# Patient Record
Sex: FEMALE | Race: WHITE | URBAN - NONMETROPOLITAN AREA
[De-identification: names, ages, dates, MRNs, and addresses within clinical notes are randomized per-mention and may not be internally consistent; named-entity substitution may affect disease eponyms.]

---

## 2019-10-10 PROBLEM — E78.5 HYPERLIPIDEMIA: Status: ACTIVE | Noted: 2019-10-10

## 2019-10-10 PROBLEM — I10 ESSENTIAL HYPERTENSION: Status: ACTIVE | Noted: 2019-10-10

## 2019-10-11 ENCOUNTER — OFFICE VISIT (OUTPATIENT)
Dept: CARDIOLOGY CLINIC | Age: 69
End: 2019-10-11
Payer: COMMERCIAL

## 2019-10-11 VITALS
DIASTOLIC BLOOD PRESSURE: 80 MMHG | WEIGHT: 134 LBS | BODY MASS INDEX: 25.3 KG/M2 | HEART RATE: 57 BPM | HEIGHT: 61 IN | SYSTOLIC BLOOD PRESSURE: 166 MMHG

## 2019-10-11 DIAGNOSIS — E78.5 HYPERLIPIDEMIA, UNSPECIFIED HYPERLIPIDEMIA TYPE: ICD-10-CM

## 2019-10-11 DIAGNOSIS — I10 ESSENTIAL HYPERTENSION: ICD-10-CM

## 2019-10-11 PROCEDURE — G8419 CALC BMI OUT NRM PARAM NOF/U: HCPCS | Performed by: INTERNAL MEDICINE

## 2019-10-11 PROCEDURE — G8484 FLU IMMUNIZE NO ADMIN: HCPCS | Performed by: INTERNAL MEDICINE

## 2019-10-11 PROCEDURE — 1090F PRES/ABSN URINE INCON ASSESS: CPT | Performed by: INTERNAL MEDICINE

## 2019-10-11 PROCEDURE — 99204 OFFICE O/P NEW MOD 45 MIN: CPT | Performed by: INTERNAL MEDICINE

## 2019-10-11 PROCEDURE — G8427 DOCREV CUR MEDS BY ELIG CLIN: HCPCS | Performed by: INTERNAL MEDICINE

## 2019-10-11 PROCEDURE — 93000 ELECTROCARDIOGRAM COMPLETE: CPT | Performed by: INTERNAL MEDICINE

## 2019-10-11 RX ORDER — LISINOPRIL 20 MG/1
20 TABLET ORAL DAILY
Qty: 30 TABLET | Refills: 5 | Status: SHIPPED | OUTPATIENT
Start: 2019-10-11 | End: 2019-10-18 | Stop reason: DRUGHIGH

## 2019-10-11 RX ORDER — SIMVASTATIN 20 MG
20 TABLET ORAL NIGHTLY
COMMUNITY
End: 2020-06-02 | Stop reason: SDUPTHER

## 2019-10-11 RX ORDER — METOPROLOL SUCCINATE 50 MG/1
50 TABLET, EXTENDED RELEASE ORAL DAILY
COMMUNITY
End: 2020-06-02 | Stop reason: SDUPTHER

## 2019-10-11 RX ORDER — CLONIDINE HYDROCHLORIDE 0.1 MG/1
0.1 TABLET ORAL 2 TIMES DAILY
COMMUNITY
End: 2020-06-02 | Stop reason: SDUPTHER

## 2019-10-18 ENCOUNTER — OFFICE VISIT (OUTPATIENT)
Dept: CARDIOLOGY CLINIC | Age: 69
End: 2019-10-18
Payer: COMMERCIAL

## 2019-10-18 VITALS
HEIGHT: 61 IN | SYSTOLIC BLOOD PRESSURE: 168 MMHG | DIASTOLIC BLOOD PRESSURE: 90 MMHG | WEIGHT: 134 LBS | BODY MASS INDEX: 25.3 KG/M2

## 2019-10-18 DIAGNOSIS — I10 HYPERTENSION, UNCONTROLLED: Primary | ICD-10-CM

## 2019-10-18 DIAGNOSIS — E78.5 HYPERLIPIDEMIA, UNSPECIFIED HYPERLIPIDEMIA TYPE: ICD-10-CM

## 2019-10-18 PROCEDURE — G8484 FLU IMMUNIZE NO ADMIN: HCPCS | Performed by: INTERNAL MEDICINE

## 2019-10-18 PROCEDURE — 99214 OFFICE O/P EST MOD 30 MIN: CPT | Performed by: INTERNAL MEDICINE

## 2019-10-18 PROCEDURE — 1123F ACP DISCUSS/DSCN MKR DOCD: CPT | Performed by: INTERNAL MEDICINE

## 2019-10-18 PROCEDURE — G8400 PT W/DXA NO RESULTS DOC: HCPCS | Performed by: INTERNAL MEDICINE

## 2019-10-18 PROCEDURE — 4004F PT TOBACCO SCREEN RCVD TLK: CPT | Performed by: INTERNAL MEDICINE

## 2019-10-18 PROCEDURE — G8417 CALC BMI ABV UP PARAM F/U: HCPCS | Performed by: INTERNAL MEDICINE

## 2019-10-18 PROCEDURE — 1090F PRES/ABSN URINE INCON ASSESS: CPT | Performed by: INTERNAL MEDICINE

## 2019-10-18 PROCEDURE — 4040F PNEUMOC VAC/ADMIN/RCVD: CPT | Performed by: INTERNAL MEDICINE

## 2019-10-18 PROCEDURE — 3017F COLORECTAL CA SCREEN DOC REV: CPT | Performed by: INTERNAL MEDICINE

## 2019-10-18 PROCEDURE — G8427 DOCREV CUR MEDS BY ELIG CLIN: HCPCS | Performed by: INTERNAL MEDICINE

## 2019-10-18 RX ORDER — LISINOPRIL 40 MG/1
40 TABLET ORAL DAILY
Qty: 90 TABLET | Refills: 3 | Status: SHIPPED | OUTPATIENT
Start: 2019-10-18 | End: 2020-04-07 | Stop reason: SDUPTHER

## 2019-10-24 ENCOUNTER — TELEPHONE (OUTPATIENT)
Dept: CARDIOLOGY CLINIC | Age: 69
End: 2019-10-24

## 2019-10-28 ENCOUNTER — TELEPHONE (OUTPATIENT)
Dept: CARDIOLOGY CLINIC | Age: 69
End: 2019-10-28

## 2019-11-01 RX ORDER — AMLODIPINE BESYLATE 10 MG/1
10 TABLET ORAL DAILY
Qty: 30 TABLET | Refills: 5 | Status: SHIPPED | OUTPATIENT
Start: 2019-11-01 | End: 2020-04-07 | Stop reason: SDUPTHER

## 2020-01-20 ENCOUNTER — TELEPHONE (OUTPATIENT)
Dept: CARDIOLOGY CLINIC | Age: 70
End: 2020-01-20

## 2020-01-20 NOTE — TELEPHONE ENCOUNTER
Please fax the EKG tracing from 10/11/19 to [facility].  Fax [phone]

## 2020-04-07 RX ORDER — AMLODIPINE BESYLATE 10 MG/1
10 TABLET ORAL DAILY
Qty: 30 TABLET | Refills: 5 | Status: SHIPPED | OUTPATIENT
Start: 2020-04-07 | End: 2020-06-02 | Stop reason: SDUPTHER

## 2020-04-07 RX ORDER — LISINOPRIL 40 MG/1
40 TABLET ORAL DAILY
Qty: 90 TABLET | Refills: 3 | Status: SHIPPED | OUTPATIENT
Start: 2020-04-07 | End: 2020-06-02 | Stop reason: SDUPTHER

## 2020-04-07 NOTE — TELEPHONE ENCOUNTER
Medication Refill    Medication needing refilled: amLODIPine (NORVASC) 10 MG tablet, lisinopril (PRINIVIL;ZESTRIL) 40 MG tablet    Doseage of the medication:    How are you taking this medication (QD, BID, TID, QID, PRN):    30 or 90 day supply called in: 80    Which Pharmacy are we sending the medication to?: [id number], [address] [facility]

## 2020-06-01 NOTE — PROGRESS NOTES
excursion and expansion without use of accessory muscles  Resp Auscultation: Normal breath sounds without dullness  Cardiovascular: The apical impulses not displaced  Heart tones are crisp and normal  Cervical veins are not engorged  Normal S1S2, No S3, No Murmur  Peripheral pulses are symmetrical and full  Extremities: There is no clubbing, cyanosis of the extremities. No edema  Femoral Arteries: 2+ and equal  Pedal Pulses: 2+ and equal   Abdomen:  No masses or tenderness  Liver/Spleen: No Abnormalities Noted  Neurological/Psychiatric:  Alert and oriented in all spheres  No abnormalities of mood, affect, memory    All testing and labs listed below were personally reviewed. Assessment:   htn  /64 (Site: Left Upper Arm, Position: Sitting, Cuff Size: Medium Adult)   Pulse 60   Resp 20   Ht 5' 1\" (1.549 m)   Wt 142 lb (64.4 kg)   SpO2 98%   BMI 26.83 kg/m²    10/18/2019 renal  Duplex--nl  Stable on norvasc catapres lisinopril and toprol    Hyperlipidemia  On zocor 20 mg daily  Followed by pcp    Edema  Bilateral  Exacerbated by norvasc  Recommend elevation of legs, support hose    Plan:  Follow up in one year with Dr Kristi Worthington  Call for any changes    Thank you for allowing me to participate in the care of this individual.    Scribe Attestation:  Heath Kaufman am scribing for and in the presence of Bri Lee MD.   Rashel Han 06/02/20 3:30 PM   Provider Norma Hernandez is working as a scribe for and in the presence of me (Bri Lee MD). Working as a scribe, Salvador Monsalve may have prepopulated components of this note with my historical  intellectual property under my direct supervision. Any additions to this intellectual property were performed in my presence and at my direction. Furthermore, the content and accuracy of this note have been reviewed by me Bri Lee MD). Juliet Scott M.D., [facility].

## 2020-06-02 ENCOUNTER — OFFICE VISIT (OUTPATIENT)
Dept: CARDIOLOGY CLINIC | Age: 70
End: 2020-06-02
Payer: COMMERCIAL

## 2020-06-02 VITALS
HEIGHT: 61 IN | OXYGEN SATURATION: 98 % | BODY MASS INDEX: 26.81 KG/M2 | SYSTOLIC BLOOD PRESSURE: 118 MMHG | HEART RATE: 60 BPM | DIASTOLIC BLOOD PRESSURE: 64 MMHG | WEIGHT: 142 LBS | RESPIRATION RATE: 20 BRPM

## 2020-06-02 PROCEDURE — 99213 OFFICE O/P EST LOW 20 MIN: CPT | Performed by: INTERNAL MEDICINE

## 2020-06-02 PROCEDURE — G8400 PT W/DXA NO RESULTS DOC: HCPCS | Performed by: INTERNAL MEDICINE

## 2020-06-02 PROCEDURE — G8417 CALC BMI ABV UP PARAM F/U: HCPCS | Performed by: INTERNAL MEDICINE

## 2020-06-02 PROCEDURE — 1123F ACP DISCUSS/DSCN MKR DOCD: CPT | Performed by: INTERNAL MEDICINE

## 2020-06-02 PROCEDURE — 1090F PRES/ABSN URINE INCON ASSESS: CPT | Performed by: INTERNAL MEDICINE

## 2020-06-02 PROCEDURE — 4004F PT TOBACCO SCREEN RCVD TLK: CPT | Performed by: INTERNAL MEDICINE

## 2020-06-02 PROCEDURE — G8427 DOCREV CUR MEDS BY ELIG CLIN: HCPCS | Performed by: INTERNAL MEDICINE

## 2020-06-02 PROCEDURE — 4040F PNEUMOC VAC/ADMIN/RCVD: CPT | Performed by: INTERNAL MEDICINE

## 2020-06-02 PROCEDURE — 3017F COLORECTAL CA SCREEN DOC REV: CPT | Performed by: INTERNAL MEDICINE

## 2020-06-02 RX ORDER — AMLODIPINE BESYLATE 10 MG/1
10 TABLET ORAL DAILY
Qty: 30 TABLET | Refills: 5 | Status: SHIPPED | OUTPATIENT
Start: 2020-06-02

## 2020-06-02 RX ORDER — LISINOPRIL 40 MG/1
40 TABLET ORAL DAILY
Qty: 90 TABLET | Refills: 3 | Status: SHIPPED | OUTPATIENT
Start: 2020-06-02

## 2020-06-02 RX ORDER — METOPROLOL SUCCINATE 50 MG/1
50 TABLET, EXTENDED RELEASE ORAL DAILY
Qty: 90 TABLET | Refills: 3 | Status: SHIPPED | OUTPATIENT
Start: 2020-06-02

## 2020-06-02 RX ORDER — CLONIDINE HYDROCHLORIDE 0.1 MG/1
0.1 TABLET ORAL 2 TIMES DAILY
Qty: 180 TABLET | Refills: 3 | Status: SHIPPED | OUTPATIENT
Start: 2020-06-02 | End: 2021-06-29 | Stop reason: SDUPTHER

## 2020-06-02 RX ORDER — SIMVASTATIN 20 MG
20 TABLET ORAL NIGHTLY
Qty: 90 TABLET | Refills: 3 | Status: SHIPPED | OUTPATIENT
Start: 2020-06-02

## 2020-06-15 ENCOUNTER — TELEPHONE (OUTPATIENT)
Dept: CARDIOLOGY CLINIC | Age: 70
End: 2020-06-15

## 2020-06-15 NOTE — TELEPHONE ENCOUNTER
Pt Stated that she is having Palpitations 1-2 times a week. Pt stated that the chest pains comes and goes. Pt stated that she is also having burning and tingles in the left arm \"like her left arm is going to sleep\" Pt stated that this has been going on for about 1-2 weeks. She also stated that she is having burning in between her shoulder blades. Pt stated that all the symptoms have started to get really bad today. I spoke with Dejuan Carbajal RN she advise that the pt go to the ER. Pt verbalized understanding.

## 2020-07-24 ENCOUNTER — TELEPHONE (OUTPATIENT)
Dept: CARDIOLOGY CLINIC | Age: 70
End: 2020-07-24

## 2020-12-01 NOTE — PROGRESS NOTES
[address]   Cardiac Followup    Referring Provider:  Laly Watkins DO     Chief Complaint   Patient presents with    6 Month Follow-Up     Pt states she has been having increased heartburn.  Hyperlipidemia    Hypertension        History of Present Illness:  Mrs Kim Escobedo is seen as a follow up for management of htn,hchol and diabetes. She has a history of tobacco abuse    Today she complains of random episodes of fullness in her throat, associated with heartburn,not exacerbated by food or exercise. She has reflux and acid in her throat. She has not taken any antacids to see if this helps. She started riding her stationary bike, because she has gained 10 pounds. She has unchanged exertional sob alleviated with rest. She has no palpitations dizziness or syncope. Patient is compliant  with medication and is tolerating them well without side effects      Past Medical History:   has a past medical history of Arthritis, Hyperlipidemia, Hypertension, and Type II or unspecified type diabetes mellitus without mention of complication, not stated as uncontrolled. Surgical History:   has a past surgical history that includes Hysterectomy and Tonsillectomy. Social History:   reports that she has been smoking cigarettes. She has a 10.00 pack-year smoking history. She has never used smokeless tobacco. She reports that she does not drink alcohol. Family History:  family history includes Cancer in her mother; Diabetes in her brother and sister; Heart Disease in her brother and sister; High Blood Pressure in her brother and sister; Osteoporosis in her paternal aunt and paternal uncle; Stroke in her father.      Home Medications:  Current Outpatient Medications   Medication Sig Dispense Refill    amLODIPine (NORVASC) 10 MG tablet Take 1 tablet by mouth daily 30 tablet 5    lisinopril (PRINIVIL;ZESTRIL) 40 MG tablet Take 1 tablet by mouth daily 90 tablet 3    simvastatin (ZOCOR) 20 MG tablet Take 1 tablet by mouth nightly 90 tablet 3    cloNIDine (CATAPRES) 0.1 MG tablet Take 1 tablet by mouth 2 times daily 180 tablet 3    metoprolol succinate (TOPROL XL) 50 MG extended release tablet Take 1 tablet by mouth daily 90 tablet 3    metformin (GLUCOPHAGE-XR) 500 MG XR tablet Take 1 tablet by mouth every evening. 90 tablet 1    aspirin 81 MG tablet Take 81 mg by mouth daily.  meloxicam (MOBIC) 7.5 MG tablet Take 7.5 mg by mouth daily       cyclobenzaprine (FLEXERIL) 10 MG tablet Take 10 mg by mouth daily. No current facility-administered medications for this visit. Allergies:  Patient has no known allergies. Review of Systems:   · Constitutional: there has been no unanticipated weight loss. There's been no change in energy level, sleep pattern, or activity level. · Eyes: No visual changes or diplopia. No scleral icterus. · ENT: No Headaches, hearing loss or vertigo. No mouth sores or sore throat. · Cardiovascular: Reviewed in HPI  · Respiratory: No cough or wheezing, no sputum production. No hematemesis. · Gastrointestinal: No abdominal pain, appetite loss, blood in stools. No change in bowel or bladder habits. · Genitourinary: No dysuria, trouble voiding, or hematuria. · Musculoskeletal:  No gait disturbance, weakness or joint complaints. · Integumentary: No rash or pruritis. · Neurological: No headache, diplopia, change in muscle strength, numbness or tingling. No change in gait, balance, coordination, mood, affect, memory, mentation, behavior. · Psychiatric: No anxiety, no depression. · Endocrine: No malaise, fatigue or temperature intolerance. No excessive thirst, fluid intake, or urination. No tremor. · Hematologic/Lymphatic: No abnormal bruising or bleeding, blood clots or swollen lymph nodes. · Allergic/Immunologic: No nasal congestion or hives.     Physical Examination:    Vitals:    12/04/20 1441   BP: 116/76   Pulse: 68        Constitutional and General Appearance:   . NAD   SKIN:  .     Warm and dry  EYES:    .     EOMI  Neck:   . Normal carotid contour  Respiratory:  Normal excursion and expansion without use of accessory muscles  Resp Auscultation: Normal breath sounds without dullness  Cardiovascular: The apical impulses not displaced  Heart tones are crisp and normal  Cervical veins are not engorged  Normal S1S2, No S3, No Murmur  Peripheral pulses are symmetrical and full  Extremities: There is no clubbing, cyanosis of the extremities. No edema  Femoral Arteries: 2+ and equal  Pedal Pulses: 2+ and equal   Abdomen:  No masses or tenderness  Liver/Spleen: No Abnormalities Noted  Neurological/Psychiatric:  Alert and oriented in all spheres  No abnormalities of mood, affect, memory    All testing and labs listed below were personally reviewed. Assessment:   Throat/chest fulleness  ekg--unremarkable  Stress echo  May use tums or gaviscon, if stress is unremarkable, recommend she follow up with pcp/GI    htn  /76 (Site: Left Upper Arm, Position: Sitting, Cuff Size: Medium Adult)   Pulse 68   Ht 5' 1\" (1.549 m)   Wt 153 lb (69.4 kg)   BMI 28.91 kg/m²    10/18/2019 renal  Duplex--nl  Stable on norvasc catapres lisinopril and toprol    Hyperlipidemia  4/9/19  Tc 151  Tri 61 hdl 82 ldl 57 ast 20 alt 12  On zocor 20 mg daily  Followed by pcp    Edema  Bilateral  Exacerbated by norvasc  Recommend elevation of legs, support hose    Plan:  Stress echo--if unremarkable will refer to Dr Андрей Hernández  Follow up in 6 months with Dr Neda Lyons  Call for any changes    Thank you for allowing me to participate in the care of this individual.    Scribe Attestation:  Jhon PEDROZA, am scribing for and in the presence of Lizzie Upton MD.   Raymond Larson 12/04/20 3:17 PM   Provider Alessandra Sabillon is working as a scribe for and in the presence of me Lizzie Upton MD).   Working as a scribe, Jhon Naidu may have prepopulated components of this note with my historical  intellectual property under my direct supervision. Any additions to this intellectual property were performed in my presence and at my direction. Furthermore, the content and accuracy of this note have been reviewed by me Maksim Mandujano MD). Bladimir Juarez M.D., [facility].

## 2020-12-04 ENCOUNTER — OFFICE VISIT (OUTPATIENT)
Dept: CARDIOLOGY CLINIC | Age: 70
End: 2020-12-04
Payer: COMMERCIAL

## 2020-12-04 VITALS
WEIGHT: 153 LBS | BODY MASS INDEX: 28.89 KG/M2 | SYSTOLIC BLOOD PRESSURE: 116 MMHG | HEIGHT: 61 IN | HEART RATE: 68 BPM | DIASTOLIC BLOOD PRESSURE: 76 MMHG

## 2020-12-04 PROBLEM — R60.9 EDEMA: Status: ACTIVE | Noted: 2020-12-04

## 2020-12-04 PROCEDURE — G8427 DOCREV CUR MEDS BY ELIG CLIN: HCPCS | Performed by: INTERNAL MEDICINE

## 2020-12-04 PROCEDURE — 4004F PT TOBACCO SCREEN RCVD TLK: CPT | Performed by: INTERNAL MEDICINE

## 2020-12-04 PROCEDURE — G8400 PT W/DXA NO RESULTS DOC: HCPCS | Performed by: INTERNAL MEDICINE

## 2020-12-04 PROCEDURE — 1123F ACP DISCUSS/DSCN MKR DOCD: CPT | Performed by: INTERNAL MEDICINE

## 2020-12-04 PROCEDURE — 99213 OFFICE O/P EST LOW 20 MIN: CPT | Performed by: INTERNAL MEDICINE

## 2020-12-04 PROCEDURE — 93000 ELECTROCARDIOGRAM COMPLETE: CPT | Performed by: INTERNAL MEDICINE

## 2020-12-04 PROCEDURE — 3017F COLORECTAL CA SCREEN DOC REV: CPT | Performed by: INTERNAL MEDICINE

## 2020-12-04 PROCEDURE — G8417 CALC BMI ABV UP PARAM F/U: HCPCS | Performed by: INTERNAL MEDICINE

## 2020-12-04 PROCEDURE — G8484 FLU IMMUNIZE NO ADMIN: HCPCS | Performed by: INTERNAL MEDICINE

## 2020-12-04 PROCEDURE — 4040F PNEUMOC VAC/ADMIN/RCVD: CPT | Performed by: INTERNAL MEDICINE

## 2020-12-04 PROCEDURE — 1090F PRES/ABSN URINE INCON ASSESS: CPT | Performed by: INTERNAL MEDICINE

## 2020-12-09 ENCOUNTER — PROCEDURE VISIT (OUTPATIENT)
Dept: CARDIOLOGY CLINIC | Age: 70
End: 2020-12-09
Payer: COMMERCIAL

## 2020-12-09 LAB
LV EF: 50 %
LVEF MODALITY: NORMAL

## 2020-12-09 PROCEDURE — 93325 DOPPLER ECHO COLOR FLOW MAPG: CPT | Performed by: INTERNAL MEDICINE

## 2020-12-09 PROCEDURE — 93351 STRESS TTE COMPLETE: CPT | Performed by: INTERNAL MEDICINE

## 2020-12-09 PROCEDURE — 93320 DOPPLER ECHO COMPLETE: CPT | Performed by: INTERNAL MEDICINE

## 2021-06-16 NOTE — PROGRESS NOTES
[address]  21  Referring: Dr. Shellie Pérez CONSULT/CHIEF COMPLAINT/HPI     Reason for visit/ Chief complaint  Follow up   Risk factor managment   HPI Emmanuel Hardy is a 65 y.o. seen as a follow up for management of hypertension,hyperlipidemia and diabetes. She has a history of tobacco abuse. Her sister had several heart attacks. .She is unsure of her parents medical history. Father  age 68,mother  age 45 ( had kidney problems)  Ms Efren Acevedo has been [facility] since she was in her 29's    In the interval since her last visit she had a stress echo to evaluate \"indigestion. \" She did not achieve THR,which she attributes to leg weakness/fatigue    She has intermittent uncomfortable sensation in her back ( left and right), that has not changed or increased in severity. No associated dizziness, diaphoresis, chest or arm pain. Lasts about 15 minutes This is exacerbated by exertion, digging, lifting heavy objects, lasting 15 minutes, alleviated with rest.  She treats her back pain with ibuprofen, had an injection last week. Patient is compliant with medications and is tolerating them well without side effects     HISTORY/ALLERGIES/ROS     MedHx:  has a past medical history of Arthritis, Hyperlipidemia, Hypertension, and Type II or unspecified type diabetes mellitus without mention of complication, not stated as uncontrolled. SurgHx:  has a past surgical history that includes Hysterectomy and Tonsillectomy. SocHx:  reports that she has been smoking cigarettes. She has a 10.00 pack-year smoking history. She has never used smokeless tobacco. She reports that she does not drink alcohol. FamHx: Mom with cabg  Allergies: Patient has no known allergies. ROS:   Review of Systems   Constitutional: Positive for fatigue. Negative for activity change, diaphoresis and fever. HENT: Negative for congestion and ear discharge. Eyes: Negative for photophobia and visual disturbance.    Respiratory: Positive for shortness of breath. Negative for cough and chest tightness. Cardiovascular: Positive for palpitations. Negative for chest pain. Gastrointestinal: Positive for abdominal distention and nausea. Negative for abdominal pain and blood in stool. Endocrine: Negative for cold intolerance and polydipsia. Genitourinary: Negative for difficulty urinating and flank pain. Musculoskeletal: Positive for arthralgias, back pain, myalgias and neck pain. Skin: Negative for rash and wound. Allergic/Immunologic: Negative for environmental allergies and immunocompromised state. Neurological: Positive for dizziness, weakness and headaches. Hematological: Negative for adenopathy. Does not bruise/bleed easily. Psychiatric/Behavioral: Negative for confusion. The patient is not hyperactive. MEDICATIONS      Prior to Admission medications    Medication Sig Start Date End Date Taking? Authorizing Provider   furosemide (LASIX) 20 MG tablet TAKE 1 TABLET BY MOUTH ONCE DAILY 5/5/21  Yes Historical Provider, MD   ibuprofen (ADVIL;MOTRIN) 800 MG tablet TAKE 1 TABLET BY MOUTH THREE TIMES DAILY 5/19/21  Yes Historical Provider, MD   amLODIPine (NORVASC) 10 MG tablet Take 1 tablet by mouth daily 6/2/20  Yes Sabine Bond MD   lisinopril (PRINIVIL;ZESTRIL) 40 MG tablet Take 1 tablet by mouth daily 6/2/20  Yes Sabine Bond MD   simvastatin (ZOCOR) 20 MG tablet Take 1 tablet by mouth nightly 6/2/20  Yes Sabine Bond MD   cloNIDine (CATAPRES) 0.1 MG tablet Take 1 tablet by mouth 2 times daily 6/2/20  Yes Sabine Bond MD   metoprolol succinate (TOPROL XL) 50 MG extended release tablet Take 1 tablet by mouth daily 6/2/20  Yes Sabine Bond MD   metformin (GLUCOPHAGE-XR) 500 MG XR tablet Take 1 tablet by mouth every evening. 6/30/11  Yes Ashish Ashton MD   aspirin 81 MG tablet Take 81 mg by mouth daily.    Yes Historical Provider, MD   meloxicam (MOBIC) 7.5 MG tablet Take 7.5 mg by mouth daily Yes Historical Provider, MD   cyclobenzaprine (FLEXERIL) 10 MG tablet Take 10 mg by mouth daily. 9/16/10  Yes Historical Provider, MD       PHYSICAL EXAM        Vitals:    21 1130   BP: 116/60   Pulse: 62   Resp: 18   SpO2: 98%    Weight: 132 lb (59.9 kg)     Gen Alert, cooperative, no distress Heart  Regular rate and rhythm, no murmur   Head Normocephalic, atraumatic, no abnormalities Abd  Soft, NT, +BS, no mass, no OM   Eyes PERRLA, conj/corn clear Ext  Ext nl, AT, no C/C, no edema   Nose Nares normal, no drain age, Non-tender Pulse 2+ and symmetric   Throat Lips, mucosa, tongue normal Skin Color/text/turg nl, no rash/lesions   Neck S/S, TM, NT, no bruit Psych Nl mood and affect   Lung CTA-B, unlabored, no DTP Shoulders Tender to palpation, tense bilaterally   Ch wall NT, no deform       LABS and Imaging     Relevant and available CV data reviewed  Echo/MRI: 20 Normal left ventricle size, wall thickness and systolic function with an estimated ejection fraction of 55%. No regional wall motion abnormalities are seen. E/e\"= 9. Diastolic filling parameters suggests grade I diastolic dysfunctionTrivial mitral regurgitation. Mild tricuspid regurgitation. PASP 21mmHg. Trivial pulmonic regurgitation. IVC size is normal (<2.1cm) and collapses > 50% with respiration consistent  with normal RA pressure (3mmHg). Incidental finding, gallstones  Cath: none  Holter: none  EK20  NSR. Personally interpreted  Stress:20 Normal stress echocardiogram study. Decreased sensitivity, failure to reach target heart rate. 79% of predicted   HR. There was stress induced shortness of breath 6/10. Peak pulse ox 99%. Leg   fatigue during the exam, unable to walk very far for the stress  moderate Risk  moderate Complexity/Medical Decision Making  Outside records Reviewed  Labs Reviewed  Prior Imaging, ekg, =, echo reviewed when available  Medications reviewed  Old Notes reviewed  ASSESSMENT AND PLAN   1.  Risk factors for CAD  - new problem  - did not achieve THR during stress echo  - significant family history,plus risk factors ( tobacco abuse, hypertension,hyperlipidemia)  Plan  - calcium score to guide ascvd managemnet    2. Nicotine dependence  - smokes 10 cigarettes a day  - unsuccessful with nicotine gum  - interested in quitting with use of nicotine patch  Plan  - nicotine patch/gum  - quit date August 1    3. Essential Hypertension  - 116/60  - on norvasc 10 mg daily lisinopril 40 mg frazier catapres0.1 mg bid  -stable at goal  Plan  - continue above    4. Mixed Hyperlipidemia  - on zocor 20 mg  Plan  - continue zocor    5. Fibromyalgia and chronic pain    Patient counseled on lifestyle modification, diet, and exercise. Follow Up: 6months    Dr. Kevin Olson:  I, Megan Berry, am scribing for and in the presence of Bernie Lundberg MD.   Lester Vargas 06/18/21 12:12 PM   Physician Attestation  The scribe for and in the presence of marcella Osman DO). The scribe George Ward may have prepopulated components of this note with my historical  intellectual property under my direct supervision. Any additions to this intellectual property were performed in my presence and at my direction.   Furthermore, the content and accuracy of this note have been reviewed by me Janene Osman DO).  6/20/2021 10:12 PM

## 2021-06-18 ENCOUNTER — OFFICE VISIT (OUTPATIENT)
Dept: CARDIOLOGY CLINIC | Age: 71
End: 2021-06-18
Payer: COMMERCIAL

## 2021-06-18 VITALS
DIASTOLIC BLOOD PRESSURE: 60 MMHG | HEIGHT: 61 IN | SYSTOLIC BLOOD PRESSURE: 116 MMHG | WEIGHT: 132 LBS | RESPIRATION RATE: 18 BRPM | HEART RATE: 62 BPM | OXYGEN SATURATION: 98 % | BODY MASS INDEX: 24.92 KG/M2

## 2021-06-18 DIAGNOSIS — Z72.0 TOBACCO ABUSE: ICD-10-CM

## 2021-06-18 DIAGNOSIS — Z91.89 HIGH RISK OF CARDIAC EVENT: Primary | ICD-10-CM

## 2021-06-18 DIAGNOSIS — I10 HYPERTENSION, UNCONTROLLED: ICD-10-CM

## 2021-06-18 DIAGNOSIS — E78.5 HYPERLIPIDEMIA, UNSPECIFIED HYPERLIPIDEMIA TYPE: ICD-10-CM

## 2021-06-18 DIAGNOSIS — Z82.49 FAMILY HISTORY OF EARLY CAD: ICD-10-CM

## 2021-06-18 PROCEDURE — G8427 DOCREV CUR MEDS BY ELIG CLIN: HCPCS | Performed by: INTERNAL MEDICINE

## 2021-06-18 PROCEDURE — 3017F COLORECTAL CA SCREEN DOC REV: CPT | Performed by: INTERNAL MEDICINE

## 2021-06-18 PROCEDURE — 99204 OFFICE O/P NEW MOD 45 MIN: CPT | Performed by: INTERNAL MEDICINE

## 2021-06-18 PROCEDURE — 4004F PT TOBACCO SCREEN RCVD TLK: CPT | Performed by: INTERNAL MEDICINE

## 2021-06-18 PROCEDURE — 4040F PNEUMOC VAC/ADMIN/RCVD: CPT | Performed by: INTERNAL MEDICINE

## 2021-06-18 PROCEDURE — G8420 CALC BMI NORM PARAMETERS: HCPCS | Performed by: INTERNAL MEDICINE

## 2021-06-18 PROCEDURE — G8400 PT W/DXA NO RESULTS DOC: HCPCS | Performed by: INTERNAL MEDICINE

## 2021-06-18 PROCEDURE — 1090F PRES/ABSN URINE INCON ASSESS: CPT | Performed by: INTERNAL MEDICINE

## 2021-06-18 PROCEDURE — 1123F ACP DISCUSS/DSCN MKR DOCD: CPT | Performed by: INTERNAL MEDICINE

## 2021-06-18 RX ORDER — FUROSEMIDE 20 MG/1
TABLET ORAL
COMMUNITY
Start: 2021-05-05

## 2021-06-18 RX ORDER — IBUPROFEN 800 MG/1
TABLET ORAL
COMMUNITY
Start: 2021-05-19

## 2021-06-20 ASSESSMENT — ENCOUNTER SYMPTOMS
NAUSEA: 1
PHOTOPHOBIA: 0
ABDOMINAL DISTENTION: 1
SHORTNESS OF BREATH: 1
BLOOD IN STOOL: 0
COUGH: 0
ABDOMINAL PAIN: 0
CHEST TIGHTNESS: 0
BACK PAIN: 1

## 2021-06-28 ENCOUNTER — TELEPHONE (OUTPATIENT)
Dept: CARDIOLOGY CLINIC | Age: 71
End: 2021-06-28

## 2021-06-28 DIAGNOSIS — I25.83 CORONARY ARTERY DISEASE DUE TO LIPID RICH PLAQUE: Primary | ICD-10-CM

## 2021-06-28 DIAGNOSIS — I25.10 CORONARY ARTERY DISEASE DUE TO LIPID RICH PLAQUE: Primary | ICD-10-CM

## 2021-06-28 DIAGNOSIS — Z82.49 FAMILY HISTORY OF CORONARY ARTERY DISEASE: ICD-10-CM

## 2021-06-28 DIAGNOSIS — E78.2 MIXED HYPERLIPIDEMIA: ICD-10-CM

## 2021-06-28 NOTE — TELEPHONE ENCOUNTER
DKW wants pt to have a cardiac calcium score. Pt would like to have this done at [facility] since that is the closest for her. Please fax orders so she can call and schedule test.  Pls call to advise. Thank you.       Medication Refill    Medication needing refilled: clonidine    Dosage of the medication: 0.1 MG    How are you taking this medication (QD, BID, TID, QID, PRN): 1 tablet 2x daily    30 or 90 day supply called in: 180 tablet w/refills    When will you run out of your medication: Tuesday    Which Pharmacy are we sending the medication to?: [facility] [phone]

## 2021-06-29 RX ORDER — CLONIDINE HYDROCHLORIDE 0.1 MG/1
0.1 TABLET ORAL 2 TIMES DAILY
Qty: 180 TABLET | Refills: 3 | Status: SHIPPED | OUTPATIENT
Start: 2021-06-29

## 2021-09-23 ENCOUNTER — TELEPHONE (OUTPATIENT)
Dept: CARDIOLOGY CLINIC | Age: 71
End: 2021-09-23

## 2021-09-23 NOTE — TELEPHONE ENCOUNTER
Patient returning call to make her 6 month f/u appt with ARTURO in the [facility] office . There are no open appts in December in the [place] office . Should she be worked in with [employer] or should she be seen in January?

## 2021-09-27 ENCOUNTER — TELEPHONE (OUTPATIENT)
Dept: CARDIOLOGY CLINIC | Age: 71
End: 2021-09-27

## 2022-05-03 ENCOUNTER — TELEPHONE (OUTPATIENT)
Dept: CARDIOLOGY CLINIC | Age: 72
End: 2022-05-03

## 2022-05-03 NOTE — TELEPHONE ENCOUNTER
Please call and get patient an appt with dereck, first available, she did not follow up with her vicki appt

## 2023-07-11 ENCOUNTER — OFFICE VISIT (OUTPATIENT)
Dept: CARDIOLOGY CLINIC | Age: 73
End: 2023-07-11
Payer: COMMERCIAL

## 2023-07-11 VITALS
DIASTOLIC BLOOD PRESSURE: 58 MMHG | OXYGEN SATURATION: 98 % | HEIGHT: 61 IN | SYSTOLIC BLOOD PRESSURE: 104 MMHG | HEART RATE: 90 BPM | BODY MASS INDEX: 26.45 KG/M2 | WEIGHT: 140.1 LBS

## 2023-07-11 DIAGNOSIS — R06.02 SHORTNESS OF BREATH: ICD-10-CM

## 2023-07-11 DIAGNOSIS — Z72.0 TOBACCO ABUSE: ICD-10-CM

## 2023-07-11 DIAGNOSIS — R53.83 OTHER FATIGUE: ICD-10-CM

## 2023-07-11 DIAGNOSIS — I20.8 ANGINAL EQUIVALENT (HCC): ICD-10-CM

## 2023-07-11 DIAGNOSIS — E78.2 MIXED HYPERLIPIDEMIA: ICD-10-CM

## 2023-07-11 DIAGNOSIS — I10 ESSENTIAL HYPERTENSION: Primary | ICD-10-CM

## 2023-07-11 PROCEDURE — G8427 DOCREV CUR MEDS BY ELIG CLIN: HCPCS | Performed by: INTERNAL MEDICINE

## 2023-07-11 PROCEDURE — 93000 ELECTROCARDIOGRAM COMPLETE: CPT | Performed by: INTERNAL MEDICINE

## 2023-07-11 PROCEDURE — G8400 PT W/DXA NO RESULTS DOC: HCPCS | Performed by: INTERNAL MEDICINE

## 2023-07-11 PROCEDURE — 1090F PRES/ABSN URINE INCON ASSESS: CPT | Performed by: INTERNAL MEDICINE

## 2023-07-11 PROCEDURE — 3074F SYST BP LT 130 MM HG: CPT | Performed by: INTERNAL MEDICINE

## 2023-07-11 PROCEDURE — 3078F DIAST BP <80 MM HG: CPT | Performed by: INTERNAL MEDICINE

## 2023-07-11 PROCEDURE — 4004F PT TOBACCO SCREEN RCVD TLK: CPT | Performed by: INTERNAL MEDICINE

## 2023-07-11 PROCEDURE — G8419 CALC BMI OUT NRM PARAM NOF/U: HCPCS | Performed by: INTERNAL MEDICINE

## 2023-07-11 PROCEDURE — 1123F ACP DISCUSS/DSCN MKR DOCD: CPT | Performed by: INTERNAL MEDICINE

## 2023-07-11 PROCEDURE — 3017F COLORECTAL CA SCREEN DOC REV: CPT | Performed by: INTERNAL MEDICINE

## 2023-07-11 PROCEDURE — 99214 OFFICE O/P EST MOD 30 MIN: CPT | Performed by: INTERNAL MEDICINE

## 2023-07-11 RX ORDER — BUPROPION HYDROCHLORIDE 150 MG/1
150 TABLET, EXTENDED RELEASE ORAL 2 TIMES DAILY
Qty: 180 TABLET | Refills: 1 | Status: SHIPPED | OUTPATIENT
Start: 2023-07-11

## 2023-07-11 RX ORDER — DICYCLOMINE HYDROCHLORIDE 10 MG/1
10 CAPSULE ORAL 2 TIMES DAILY
COMMUNITY
Start: 2023-04-10

## 2023-07-11 RX ORDER — TRAZODONE HYDROCHLORIDE 50 MG/1
50 TABLET ORAL NIGHTLY
COMMUNITY
Start: 2023-04-20

## 2023-07-11 ASSESSMENT — ENCOUNTER SYMPTOMS
CHEST TIGHTNESS: 0
BLOOD IN STOOL: 0
BACK PAIN: 1
ABDOMINAL PAIN: 0
PHOTOPHOBIA: 0
ABDOMINAL DISTENTION: 1
COUGH: 0
SHORTNESS OF BREATH: 1
NAUSEA: 1

## 2023-07-11 NOTE — PATIENT INSTRUCTIONS
Continue staying active   Work on smoking cessation  Try Wellbutrin 150 mg once a day for 3 days, then take 1 tablet twice a day after.    Schedule a stress test

## 2023-07-11 NOTE — PROGRESS NOTES
smoking history. She has never used smokeless tobacco. She reports that she does not drink alcohol. FamHx: Mom with cabg  Allergies: Patient has no known allergies. ROS:   Review of Systems   Constitutional:  Positive for fatigue. Negative for activity change, diaphoresis and fever. HENT:  Negative for congestion and ear discharge. Eyes:  Negative for photophobia and visual disturbance. Respiratory:  Positive for shortness of breath. Negative for cough and chest tightness. Cardiovascular:  Positive for palpitations. Negative for chest pain. Gastrointestinal:  Positive for abdominal distention and nausea. Negative for abdominal pain and blood in stool. Endocrine: Negative for cold intolerance and polydipsia. Genitourinary:  Negative for difficulty urinating and flank pain. Musculoskeletal:  Positive for arthralgias, back pain, myalgias and neck pain. Skin:  Negative for rash and wound. Allergic/Immunologic: Negative for environmental allergies and immunocompromised state. Neurological:  Positive for dizziness, weakness and headaches. Hematological:  Negative for adenopathy. Does not bruise/bleed easily. Psychiatric/Behavioral:  Negative for confusion. The patient is not hyperactive. MEDICATIONS      Prior to Admission medications    Medication Sig Start Date End Date Taking?  Authorizing Provider   dicyclomine (BENTYL) 10 MG capsule Take 1 capsule by mouth 2 times daily 4/10/23  Yes Historical Provider, MD   traZODone (DESYREL) 50 MG tablet Take 1 tablet by mouth nightly 4/20/23  Yes Historical Provider, MD   cloNIDine (CATAPRES) 0.1 MG tablet Take 1 tablet by mouth 2 times daily 6/29/21  Yes Sharon Cabrales DO   furosemide (LASIX) 20 MG tablet TAKE 1 TABLET BY MOUTH ONCE DAILY 5/5/21  Yes Historical Provider, MD   amLODIPine (NORVASC) 10 MG tablet Take 1 tablet by mouth daily 6/2/20  Yes Miriam Hernández MD   lisinopril (PRINIVIL;ZESTRIL) 40 MG tablet Take 1 tablet by mouth daily

## 2023-07-21 ENCOUNTER — HOSPITAL ENCOUNTER (OUTPATIENT)
Dept: NON INVASIVE DIAGNOSTICS | Age: 73
Discharge: HOME | End: 2023-07-21

## 2023-08-08 ENCOUNTER — HOSPITAL ENCOUNTER (OUTPATIENT)
Dept: NON INVASIVE DIAGNOSTICS | Age: 73
Discharge: HOME | End: 2023-08-08

## 2024-02-05 ENCOUNTER — TELEPHONE (OUTPATIENT)
Dept: CARDIOLOGY CLINIC | Age: 74
End: 2024-02-05

## 2024-02-05 NOTE — TELEPHONE ENCOUNTER
Called patient, no answer, left message to go to ER  Called first to notify, Dinora. She will have patients wyatt Gilliland go check on her because she lives in [place]  Called Shefali, no answer, left message again advising her to go to the ER

## 2024-02-05 NOTE — TELEPHONE ENCOUNTER
Pt states she is experiencing a heaviness in her chest, like someone standing on her chest, sob, hard pressure, this started last 02/01. This feeling comes off and on not constant.  Please call to discuss.  Thank you

## 2024-02-05 NOTE — TELEPHONE ENCOUNTER
Spoke with patient med list up to date and she stated she has been taking her medication every day. Patient unable to take b/p she stated her machine was not working.     No dizziness or arm pain.

## 2024-02-06 NOTE — TELEPHONE ENCOUNTER
Spoke with patient and she stated that she has the chest heaviness on/off.  It can be with/without exertion and trouble getting a deep breath.   Today she denies any symptoms.   She didn't go to ED yesterday.   BP today 142/75.  She has an appointment for a mammogram at 1;30pm today.

## 2024-02-06 NOTE — TELEPHONE ENCOUNTER
LMOM for patient to return call in regards to message below. Patient needs to be seen in ED for further evaluation given symptoms.

## 2024-02-06 NOTE — TELEPHONE ENCOUNTER
PT notified and verbalized understanding. PT said she will go to ED if symptoms gets intense or worsen.

## 2024-02-06 NOTE — TELEPHONE ENCOUNTER
LMOM for patient to return call .     Per DKW, patient needs to be seen in the ED for further evaluation given symptoms.

## 2024-02-07 ENCOUNTER — HOSPITAL ENCOUNTER (OUTPATIENT)
Age: 74
Setting detail: OBSERVATION
Discharge: HOME | End: 2024-02-08
Attending: INTERNAL MEDICINE | Admitting: INTERNAL MEDICINE
Payer: COMMERCIAL

## 2024-02-07 ENCOUNTER — APPOINTMENT (OUTPATIENT)
Dept: GENERAL RADIOLOGY | Age: 74
End: 2024-02-07
Payer: COMMERCIAL

## 2024-02-07 ENCOUNTER — TELEPHONE (OUTPATIENT)
Dept: CARDIOLOGY CLINIC | Age: 74
End: 2024-02-07

## 2024-02-07 DIAGNOSIS — R07.9 CHEST PAIN, UNSPECIFIED TYPE: Primary | ICD-10-CM

## 2024-02-07 LAB
ALBUMIN SERPL-MCNC: 4.4 G/DL (ref 3.4–5)
ALBUMIN/GLOB SERPL: 1.4 {RATIO} (ref 1.1–2.2)
ALP SERPL-CCNC: 81 U/L (ref 40–129)
ALT SERPL-CCNC: 9 U/L (ref 10–40)
ANION GAP SERPL CALCULATED.3IONS-SCNC: 10 MMOL/L (ref 3–16)
AST SERPL-CCNC: 19 U/L (ref 15–37)
BASOPHILS # BLD: 0 K/UL (ref 0–0.2)
BASOPHILS NFR BLD: 0.9 %
BILIRUB SERPL-MCNC: <0.2 MG/DL (ref 0–1)
BUN SERPL-MCNC: 10 MG/DL (ref 7–20)
CALCIUM SERPL-MCNC: 9.3 MG/DL (ref 8.3–10.6)
CHLORIDE SERPL-SCNC: 105 MMOL/L (ref 99–110)
CO2 SERPL-SCNC: 25 MMOL/L (ref 21–32)
CREAT SERPL-MCNC: 0.7 MG/DL (ref 0.6–1.2)
DEPRECATED RDW RBC AUTO: 13.6 % (ref 12.4–15.4)
EOSINOPHIL # BLD: 0.1 K/UL (ref 0–0.6)
EOSINOPHIL NFR BLD: 1.9 %
GFR SERPLBLD CREATININE-BSD FMLA CKD-EPI: >60 ML/MIN/{1.73_M2}
GLUCOSE BLD-MCNC: 190 MG/DL (ref 70–99)
GLUCOSE SERPL-MCNC: 127 MG/DL (ref 70–99)
HCT VFR BLD AUTO: 37.3 % (ref 36–48)
HGB BLD-MCNC: 12.6 G/DL (ref 12–16)
LYMPHOCYTES # BLD: 2.2 K/UL (ref 1–5.1)
LYMPHOCYTES NFR BLD: 48.2 %
MCH RBC QN AUTO: 30.8 PG (ref 26–34)
MCHC RBC AUTO-ENTMCNC: 33.8 G/DL (ref 31–36)
MCV RBC AUTO: 91.1 FL (ref 80–100)
MONOCYTES # BLD: 0.5 K/UL (ref 0–1.3)
MONOCYTES NFR BLD: 10.2 %
NEUTROPHILS # BLD: 1.8 K/UL (ref 1.7–7.7)
NEUTROPHILS NFR BLD: 38.8 %
PERFORMED ON: ABNORMAL
PLATELET # BLD AUTO: 194 K/UL (ref 135–450)
PMV BLD AUTO: 9.6 FL (ref 5–10.5)
POTASSIUM SERPL-SCNC: 3.9 MMOL/L (ref 3.5–5.1)
PROT SERPL-MCNC: 7.6 G/DL (ref 6.4–8.2)
RBC # BLD AUTO: 4.1 M/UL (ref 4–5.2)
SODIUM SERPL-SCNC: 140 MMOL/L (ref 136–145)
TROPONIN, HIGH SENSITIVITY: <6 NG/L (ref 0–14)
TROPONIN, HIGH SENSITIVITY: <6 NG/L (ref 0–14)
WBC # BLD AUTO: 4.7 K/UL (ref 4–11)

## 2024-02-07 PROCEDURE — 84484 ASSAY OF TROPONIN QUANT: CPT

## 2024-02-07 PROCEDURE — 6370000000 HC RX 637 (ALT 250 FOR IP): Performed by: INTERNAL MEDICINE

## 2024-02-07 PROCEDURE — 6370000000 HC RX 637 (ALT 250 FOR IP): Performed by: PHYSICIAN ASSISTANT

## 2024-02-07 PROCEDURE — 71045 X-RAY EXAM CHEST 1 VIEW: CPT

## 2024-02-07 PROCEDURE — 80053 COMPREHEN METABOLIC PANEL: CPT

## 2024-02-07 PROCEDURE — 6360000002 HC RX W HCPCS: Performed by: INTERNAL MEDICINE

## 2024-02-07 PROCEDURE — 99285 EMERGENCY DEPT VISIT HI MDM: CPT

## 2024-02-07 PROCEDURE — G0378 HOSPITAL OBSERVATION PER HR: HCPCS

## 2024-02-07 PROCEDURE — 96372 THER/PROPH/DIAG INJ SC/IM: CPT

## 2024-02-07 PROCEDURE — 2580000003 HC RX 258: Performed by: INTERNAL MEDICINE

## 2024-02-07 PROCEDURE — 85025 COMPLETE CBC W/AUTO DIFF WBC: CPT

## 2024-02-07 PROCEDURE — 93005 ELECTROCARDIOGRAM TRACING: CPT | Performed by: EMERGENCY MEDICINE

## 2024-02-07 PROCEDURE — 36415 COLL VENOUS BLD VENIPUNCTURE: CPT

## 2024-02-07 RX ORDER — DICYCLOMINE HYDROCHLORIDE 10 MG/1
10 CAPSULE ORAL 2 TIMES DAILY
Status: DISCONTINUED | OUTPATIENT
Start: 2024-02-07 | End: 2024-02-08 | Stop reason: HOSPADM

## 2024-02-07 RX ORDER — LISINOPRIL 10 MG/1
40 TABLET ORAL DAILY
Status: DISCONTINUED | OUTPATIENT
Start: 2024-02-08 | End: 2024-02-08 | Stop reason: HOSPADM

## 2024-02-07 RX ORDER — INSULIN LISPRO 100 [IU]/ML
0-8 INJECTION, SOLUTION INTRAVENOUS; SUBCUTANEOUS
Status: DISCONTINUED | OUTPATIENT
Start: 2024-02-07 | End: 2024-02-08 | Stop reason: HOSPADM

## 2024-02-07 RX ORDER — GLUCAGON 1 MG/ML
1 KIT INJECTION PRN
Status: DISCONTINUED | OUTPATIENT
Start: 2024-02-07 | End: 2024-02-08 | Stop reason: HOSPADM

## 2024-02-07 RX ORDER — POLYETHYLENE GLYCOL 3350 17 G/17G
17 POWDER, FOR SOLUTION ORAL DAILY PRN
Status: DISCONTINUED | OUTPATIENT
Start: 2024-02-07 | End: 2024-02-08 | Stop reason: HOSPADM

## 2024-02-07 RX ORDER — POTASSIUM CHLORIDE 20 MEQ/1
40 TABLET, EXTENDED RELEASE ORAL PRN
Status: DISCONTINUED | OUTPATIENT
Start: 2024-02-07 | End: 2024-02-08 | Stop reason: HOSPADM

## 2024-02-07 RX ORDER — BUPROPION HYDROCHLORIDE 150 MG/1
150 TABLET, EXTENDED RELEASE ORAL 2 TIMES DAILY
Status: DISCONTINUED | OUTPATIENT
Start: 2024-02-07 | End: 2024-02-07

## 2024-02-07 RX ORDER — DEXTROSE MONOHYDRATE 100 MG/ML
INJECTION, SOLUTION INTRAVENOUS CONTINUOUS PRN
Status: DISCONTINUED | OUTPATIENT
Start: 2024-02-07 | End: 2024-02-08 | Stop reason: HOSPADM

## 2024-02-07 RX ORDER — SODIUM CHLORIDE 0.9 % (FLUSH) 0.9 %
5-40 SYRINGE (ML) INJECTION EVERY 12 HOURS SCHEDULED
Status: DISCONTINUED | OUTPATIENT
Start: 2024-02-07 | End: 2024-02-08 | Stop reason: HOSPADM

## 2024-02-07 RX ORDER — ASPIRIN 81 MG/1
324 TABLET, CHEWABLE ORAL ONCE
Status: COMPLETED | OUTPATIENT
Start: 2024-02-07 | End: 2024-02-07

## 2024-02-07 RX ORDER — FUROSEMIDE 40 MG/1
20 TABLET ORAL DAILY
Status: DISCONTINUED | OUTPATIENT
Start: 2024-02-08 | End: 2024-02-08 | Stop reason: HOSPADM

## 2024-02-07 RX ORDER — SODIUM CHLORIDE 0.9 % (FLUSH) 0.9 %
5-40 SYRINGE (ML) INJECTION PRN
Status: DISCONTINUED | OUTPATIENT
Start: 2024-02-07 | End: 2024-02-08 | Stop reason: HOSPADM

## 2024-02-07 RX ORDER — TRAZODONE HYDROCHLORIDE 50 MG/1
50 TABLET ORAL NIGHTLY
Status: DISCONTINUED | OUTPATIENT
Start: 2024-02-07 | End: 2024-02-08 | Stop reason: HOSPADM

## 2024-02-07 RX ORDER — METOPROLOL SUCCINATE 50 MG/1
50 TABLET, EXTENDED RELEASE ORAL DAILY
Status: DISCONTINUED | OUTPATIENT
Start: 2024-02-08 | End: 2024-02-08

## 2024-02-07 RX ORDER — ACETAMINOPHEN 650 MG/1
650 SUPPOSITORY RECTAL EVERY 6 HOURS PRN
Status: DISCONTINUED | OUTPATIENT
Start: 2024-02-07 | End: 2024-02-08 | Stop reason: HOSPADM

## 2024-02-07 RX ORDER — SODIUM CHLORIDE 9 MG/ML
INJECTION, SOLUTION INTRAVENOUS PRN
Status: DISCONTINUED | OUTPATIENT
Start: 2024-02-07 | End: 2024-02-08 | Stop reason: HOSPADM

## 2024-02-07 RX ORDER — ATORVASTATIN CALCIUM 80 MG/1
40 TABLET, FILM COATED ORAL NIGHTLY
Status: DISCONTINUED | OUTPATIENT
Start: 2024-02-07 | End: 2024-02-08 | Stop reason: HOSPADM

## 2024-02-07 RX ORDER — INSULIN LISPRO 100 [IU]/ML
0-4 INJECTION, SOLUTION INTRAVENOUS; SUBCUTANEOUS NIGHTLY
Status: DISCONTINUED | OUTPATIENT
Start: 2024-02-07 | End: 2024-02-08 | Stop reason: HOSPADM

## 2024-02-07 RX ORDER — REGADENOSON 0.08 MG/ML
0.4 INJECTION, SOLUTION INTRAVENOUS
Status: COMPLETED | OUTPATIENT
Start: 2024-02-07 | End: 2024-02-08

## 2024-02-07 RX ORDER — MAGNESIUM SULFATE IN WATER 40 MG/ML
2000 INJECTION, SOLUTION INTRAVENOUS PRN
Status: DISCONTINUED | OUTPATIENT
Start: 2024-02-07 | End: 2024-02-08 | Stop reason: HOSPADM

## 2024-02-07 RX ORDER — ACETAMINOPHEN 325 MG/1
650 TABLET ORAL EVERY 6 HOURS PRN
Status: DISCONTINUED | OUTPATIENT
Start: 2024-02-07 | End: 2024-02-08 | Stop reason: HOSPADM

## 2024-02-07 RX ORDER — ONDANSETRON 2 MG/ML
4 INJECTION INTRAMUSCULAR; INTRAVENOUS EVERY 6 HOURS PRN
Status: DISCONTINUED | OUTPATIENT
Start: 2024-02-07 | End: 2024-02-08 | Stop reason: HOSPADM

## 2024-02-07 RX ORDER — ENOXAPARIN SODIUM 100 MG/ML
40 INJECTION SUBCUTANEOUS NIGHTLY
Status: DISCONTINUED | OUTPATIENT
Start: 2024-02-07 | End: 2024-02-08 | Stop reason: HOSPADM

## 2024-02-07 RX ORDER — ASPIRIN 81 MG/1
81 TABLET, CHEWABLE ORAL DAILY
Status: DISCONTINUED | OUTPATIENT
Start: 2024-02-08 | End: 2024-02-08 | Stop reason: HOSPADM

## 2024-02-07 RX ORDER — AMLODIPINE BESYLATE 5 MG/1
10 TABLET ORAL DAILY
Status: DISCONTINUED | OUTPATIENT
Start: 2024-02-08 | End: 2024-02-08 | Stop reason: HOSPADM

## 2024-02-07 RX ORDER — CYCLOBENZAPRINE HCL 10 MG
10 TABLET ORAL DAILY
Status: DISCONTINUED | OUTPATIENT
Start: 2024-02-07 | End: 2024-02-07

## 2024-02-07 RX ORDER — ONDANSETRON 4 MG/1
4 TABLET, ORALLY DISINTEGRATING ORAL EVERY 8 HOURS PRN
Status: DISCONTINUED | OUTPATIENT
Start: 2024-02-07 | End: 2024-02-08 | Stop reason: HOSPADM

## 2024-02-07 RX ORDER — CLONIDINE HYDROCHLORIDE 0.1 MG/1
0.1 TABLET ORAL 2 TIMES DAILY
Status: DISCONTINUED | OUTPATIENT
Start: 2024-02-07 | End: 2024-02-08 | Stop reason: HOSPADM

## 2024-02-07 RX ORDER — ATORVASTATIN CALCIUM 80 MG/1
40 TABLET, FILM COATED ORAL DAILY
Status: DISCONTINUED | OUTPATIENT
Start: 2024-02-07 | End: 2024-02-07 | Stop reason: SDUPTHER

## 2024-02-07 RX ORDER — POTASSIUM CHLORIDE 7.45 MG/ML
10 INJECTION INTRAVENOUS PRN
Status: DISCONTINUED | OUTPATIENT
Start: 2024-02-07 | End: 2024-02-08 | Stop reason: HOSPADM

## 2024-02-07 RX ADMIN — ATORVASTATIN CALCIUM 40 MG: 80 TABLET, FILM COATED ORAL at 21:50

## 2024-02-07 RX ADMIN — Medication 10 ML: at 21:52

## 2024-02-07 RX ADMIN — ENOXAPARIN SODIUM 40 MG: 100 INJECTION SUBCUTANEOUS at 21:49

## 2024-02-07 RX ADMIN — DICYCLOMINE HYDROCHLORIDE 10 MG: 10 CAPSULE ORAL at 21:50

## 2024-02-07 RX ADMIN — CLONIDINE HYDROCHLORIDE 0.1 MG: 0.1 TABLET ORAL at 21:49

## 2024-02-07 RX ADMIN — TRAZODONE HYDROCHLORIDE 50 MG: 50 TABLET ORAL at 21:49

## 2024-02-07 RX ADMIN — ASPIRIN 81 MG 324 MG: 81 TABLET ORAL at 16:11

## 2024-02-07 ASSESSMENT — PAIN SCALES - GENERAL
PAINLEVEL_OUTOF10: 0
PAINLEVEL_OUTOF10: 0

## 2024-02-07 ASSESSMENT — ENCOUNTER SYMPTOMS
VOMITING: 0
ABDOMINAL PAIN: 0
DIARRHEA: 1
COUGH: 1
SHORTNESS OF BREATH: 1
RHINORRHEA: 0
WHEEZING: 0
NAUSEA: 0

## 2024-02-07 ASSESSMENT — HEART SCORE: ECG: 0

## 2024-02-07 ASSESSMENT — PAIN - FUNCTIONAL ASSESSMENT: PAIN_FUNCTIONAL_ASSESSMENT: NONE - DENIES PAIN

## 2024-02-07 NOTE — ED PROVIDER NOTES
Pressure Brother     Osteoporosis Paternal Aunt     Osteoporosis Paternal Uncle     Thyroid Disease Neg Hx         SOCIAL HISTORY       Social History     Tobacco Use    Smoking status: Every Day     Current packs/day: 0.00     Average packs/day: 0.3 packs/day for 40.0 years (10.0 ttl pk-yrs)     Types: Cigarettes     Start date: 1967     Last attempt to quit: 2007     Years since quittin.1    Smokeless tobacco: Never    Tobacco comments:     counseled pt smoking cessation on 2011   Vaping Use    Vaping Use: Never used   Substance Use Topics    Alcohol use: No    Drug use: Never       SCREENINGS        [place] Coma Scale  Eye Opening: Spontaneous  Best Verbal Response: Oriented  Best Motor Response: Obeys commands  [place] Coma Scale Score: 15                CIWA Assessment  BP: (!) 147/81  Pulse: 66           PHYSICAL EXAM  1 or more Elements     ED Triage Vitals   BP Temp Temp Source Pulse Respirations SpO2 Height Weight - Scale   24 1504 24 1504 24 1504 24 1504 24 1504 24 1504 -- 24 1500   (!) 147/81 98.1 °F (36.7 °C) Oral 66 16 97 %  63.5 kg (140 lb)       Physical Exam  Constitutional:       Appearance: Normal appearance. She is not diaphoretic.   HENT:      Head: Normocephalic and atraumatic.      Right Ear: Tympanic membrane normal.      Left Ear: Tympanic membrane normal.      Nose: Nose normal.      Mouth/Throat:      Mouth: Mucous membranes are moist.      Pharynx: Oropharynx is clear.   Neck:      Vascular: No JVD.   Cardiovascular:      Rate and Rhythm: Normal rate and regular rhythm.      Pulses: Normal pulses.      Heart sounds: Normal heart sounds.   Pulmonary:      Effort: Pulmonary effort is normal.      Breath sounds: Normal breath sounds.   Abdominal:      General: Abdomen is flat.      Palpations: Abdomen is soft.   Musculoskeletal:      Right lower leg: No edema.      Left lower leg: No edema.   Skin:     General: Skin is warm and dry.  favored over pneumonia. 2. Borderline cardiomegaly.       No results found.    PROCEDURES   Unless otherwise noted below, none     Procedures    CRITICAL CARE TIME (.cctime)       PAST MEDICAL HISTORY      has a past medical history of Arthritis, Hyperlipidemia, Hypertension, and Type II or unspecified type diabetes mellitus without mention of complication, not stated as uncontrolled.     EMERGENCY DEPARTMENT COURSE and DIFFERENTIAL DIAGNOSIS/MDM:   Vitals:    Vitals:    02/07/24 1500 02/07/24 1504   BP:  (!) 147/81   Pulse:  66   Resp:  16   Temp:  98.1 °F (36.7 °C)   TempSrc:  Oral   SpO2:  97%   Weight: 63.5 kg (140 lb)        Patient was given the following medications:  Medications   aspirin chewable tablet 324 mg (324 mg Oral Given 2/7/24 1611)             Is this patient to be included in the SEP-1 Core Measure due to severe sepsis or septic shock?   No   Exclusion criteria - the patient is NOT to be included for SEP-1 Core Measure due to:  2+ SIRS criteria are not met    Chronic Conditions affecting care:    has a past medical history of Arthritis, Hyperlipidemia, Hypertension, and Type II or unspecified type diabetes mellitus without mention of complication, not stated as uncontrolled.    CONSULTS: (Who and What was discussed)  IP CONSULT TO CARDIOLOGY      Social Determinants Significantly Affecting Health : None    Records Reviewed (External and Source) n/a    CC/HPI Summary, DDx, ED Course, and Reassessment:  This is a 67-year-old female with past history of diabetes, hypertension, hyperlipidemia and significant pack-year smoking history who presents today with chest discomfort and shortness of breath that began 6 days ago and last episode was 3 days ago.  Most recent episode lasted for 1 hour and resolved on its own.  On exam, she is resting comfortably in bed no acute distress and nontoxic.  Vitals are stable and she is afebrile.  She was given aspirin and will be reevaluated.  Currently asymptomatic.

## 2024-02-07 NOTE — H&P
V2.0  History and Physical      Name:  Shefali Quintanilla /Age/Sex: 1950  (73 y.o. female)   MRN & CSN:  [id number] & [id number] Encounter Date/Time: 2024 5:17 PM EST   Location:  [id number]- PCP: Rashi León DO       Hospital Day: 1    Assessment and Plan:   Shefali Quintanilla is a 73 y.o. female with a pmh of T2 DM, HTN, HLD, current smoker presented with complaints of chest tightness and shortness of breath x 6 days.    Hospital Problems             Last Modified POA    * (Principal) Chest pain 2024 Yes       Plan:    Chest Pain:  Troponin negative x 2.  EKG: SB, non-specific ST-T changes.  CXR: mild left basal linear opacity likely atelectasis.  Cardiology consult.  ECHO, Stress test.  ASA, Statin.      T2 DM: Monitor on SSI. Follow up A1c    HTN: Monitor on Home medications.    Disposition:   Current Living situation: Home  Expected Disposition: Home  Estimated D/C: 24    Diet Diet NPO Exceptions are: Sips of Water with Meds  ADULT DIET; Regular; 4 carb choices (60 gm/meal); Low Sodium (2 gm)   DVT Prophylaxis [x] Lovenox, []  Heparin, [] SCDs, [] Ambulation,  [] Eliquis, [] Xarelto, [] Coumadin   Code Status Full Code   Surrogate Decision Maker/ sreedhar Bettencourt (Other)   [phone]     Personally reviewed Lab Studies and Imaging     EKG interpreted personally and results: EKG: SB, non-specific ST-T changes.    Imaging that was interpreted personally includes CXR which showed  mild left basal linear opacity likely atelectasis.      History from:     patient    History of Present Illness:     Chief Complaint: Chest Pian    Shefali Quintanilla is a 73 y.o. female with pmh of T2 DM, HTN, HLD, current smoker presented with complaints of chest tightness and shortness of breath x 6 days. Symptoms date back to 2023 when she was evaluated in the ED and discharged home but has had intermittent episodes since then.   Left-sided, described as \"someone sitting on the chest\", unprovoked, no  release tablet Take 1 tablet by mouth daily 6/2/20   David Shaikh MD   metformin (GLUCOPHAGE-XR) 500 MG XR tablet Take 1 tablet by mouth every evening.  Patient taking differently: Take 1 tablet by mouth daily (with breakfast) 6/30/11   Mackenzie Elder MD   aspirin 81 MG tablet Take 1 tablet by mouth daily    ProviderRosalind MD   meloxicam (MOBIC) 7.5 MG tablet Take 1 tablet by mouth 2 times daily    Rosalind High MD   cyclobenzaprine (FLEXERIL) 10 MG tablet Take 1 tablet by mouth daily  Patient not taking: Reported on 2/7/2024 9/16/10   Provider, MD Rosalind       Physical Exam:    Physical Exam     General: NAD  Eyes: EOMI  ENT: neck supple  Cardiovascular: Regular rate.  Respiratory: Clear to auscultation  Gastrointestinal: Soft, non tender  Genitourinary: no suprapubic tenderness  Musculoskeletal: No edema  Skin: warm, dry  Neuro: Alert.  Psych: Mood appropriate.       Past Medical History:   PMHx   Past Medical History:   Diagnosis Date    Arthritis     Hyperlipidemia     Hypertension     Type II or unspecified type diabetes mellitus without mention of complication, not stated as uncontrolled      PSHX:  has a past surgical history that includes Hysterectomy and Tonsillectomy.  Allergies: No Known Allergies  Fam HX: family history includes Cancer in her mother; Diabetes in her brother and sister; Heart Disease in her brother and sister; High Blood Pressure in her brother and sister; Osteoporosis in her paternal aunt and paternal uncle; Stroke in her father.  Soc HX:   Social History     Socioeconomic History    Marital status:      Spouse name: None    Number of children: None    Years of education: None    Highest education level: None   Tobacco Use    Smoking status: Every Day     Current packs/day: 0.00     Average packs/day: 0.3 packs/day for 40.0 years (10.0 ttl pk-yrs)     Types: Cigarettes     Start date: 1/1/1967     Last attempt to quit: 1/1/2007     Years since quitting:

## 2024-02-07 NOTE — ED NOTES
ED TO INPATIENT SBAR HANDOFF    Patient Name: Shefali Quintanilla   :  1950  73 y.o.   MRN:  [id number]  Preferred Name  n/a  ED Room #:  NETTE/NONE  Family/Caregiver Present no   Restraints no   Sitter no   Sepsis Risk Score Sepsis Risk Score: 0.82    Situation  Code Status: No Order No additional code details.    Allergies: Patient has no known allergies.  Weight: Patient Vitals for the past 96 hrs (Last 3 readings):   Weight   24 1500 63.5 kg (140 lb)     Arrived from: home  Chief Complaint:   Chief Complaint   Patient presents with    Shortness of Breath     Pt reports intermittent episodes of chest pressure, and shortness of breath since Thursday. Pt denies chest pain.      Hospital Problem/Diagnosis:  Principal Problem:    Chest pain  Resolved Problems:    * No resolved hospital problems. *    Imaging:   XR CHEST PORTABLE   Final Result   1. Mild left basal airspace opacities which appear predominantly linear.   Atelectasis is favored over pneumonia.   2. Borderline cardiomegaly.           Abnormal labs:   Abnormal Labs Reviewed   COMPREHENSIVE METABOLIC PANEL - Abnormal; Notable for the following components:       Result Value    Glucose 127 (*)     ALT 9 (*)     All other components within normal limits     Critical values: no     Abnormal Assessment Findings: n/a    Background  History:   Past Medical History:   Diagnosis Date    Arthritis     Hyperlipidemia     Hypertension     Type II or unspecified type diabetes mellitus without mention of complication, not stated as uncontrolled        Assessment    Vitals/MEWS:        Vitals:    24 1500 24 1504   BP:  (!) 147/81   Pulse:  66   Resp:  16   Temp:  98.1 °F (36.7 °C)   TempSrc:  Oral   SpO2:  97%   Weight: 63.5 kg (140 lb)      FiO2 (%): n/a  O2 Flow Rate:      Cardiac Rhythm:    Pain Assessment: UNK [x] Verbal [] [place] Scale  Pain Scale: Pain Assessment  Pain Assessment: None - Denies Pain  Last documented pain score (0-10 scale)     Last documented pain medication administered: ASA  Mental Status: alert  Orientation Level:    NIH Score:    C-SSRS: Risk of Suicide: No Risk  Bedside swallow:    [place] Coma Scale (GCS): [place] Coma Scale  Eye Opening: Spontaneous  Best Verbal Response: Oriented  Best Motor Response: Obeys commands  [place] Coma Scale Score: 15  Active LDA's:    PO Status: Regular  Pertinent or High Risk Medications/Drips: no   If Yes, please provide details: n/a  Pending Blood Product Administration: no       You may also review the ED PT Care Timeline found under the Summary Nursing Index tab.    Recommendation    Pending orders see orders  Plan for Discharge (if known): home   Additional Comments: PT remained in waiting room and went through [facility]ANDA only. Limited vitals.    If any further questions, please call Sending RN at [phone]    Electronically signed by: Electronically signed by Jacob Lemons RN on 2/7/2024 at 6:04 PMED TO INPATIENT SBAR HANDOFF

## 2024-02-07 NOTE — PROGRESS NOTES
Patient to room 5916 via wheelchair from the ED. A&O x 4. Independent in the room. Clothes changed, now in gown and Non slip socks. Tele applied, NSR. Hard of hearing, brought her personal device and headphones to help her hear better. Diet order placed, home medications reviewed and updated in chart. Currently in bed with call light within reach. Awaiting dinner.

## 2024-02-07 NOTE — TELEPHONE ENCOUNTER
Called patient, had severe chest pain and heaviness on and off all weekend. Last episode was Sunday night. Occurs with exertion and rest. Discussed with Dr Santos advised her to come to ER for unstable angina- verbalized understanding

## 2024-02-08 ENCOUNTER — APPOINTMENT (OUTPATIENT)
Dept: CT IMAGING | Age: 74
End: 2024-02-08
Payer: COMMERCIAL

## 2024-02-08 VITALS
DIASTOLIC BLOOD PRESSURE: 63 MMHG | OXYGEN SATURATION: 96 % | BODY MASS INDEX: 26.87 KG/M2 | TEMPERATURE: 98 F | WEIGHT: 142.3 LBS | HEIGHT: 61 IN | HEART RATE: 60 BPM | SYSTOLIC BLOOD PRESSURE: 138 MMHG | RESPIRATION RATE: 18 BRPM

## 2024-02-08 PROBLEM — J43.8 OTHER EMPHYSEMA (HCC): Status: ACTIVE | Noted: 2024-02-08

## 2024-02-08 PROBLEM — E11.9 TYPE 2 DIABETES MELLITUS, WITHOUT LONG-TERM CURRENT USE OF INSULIN (HCC): Status: ACTIVE | Noted: 2024-02-08

## 2024-02-08 LAB
ANION GAP SERPL CALCULATED.3IONS-SCNC: 11 MMOL/L (ref 3–16)
BUN SERPL-MCNC: 11 MG/DL (ref 7–20)
CALCIUM SERPL-MCNC: 8.9 MG/DL (ref 8.3–10.6)
CHLORIDE SERPL-SCNC: 102 MMOL/L (ref 99–110)
CHOLEST SERPL-MCNC: 144 MG/DL (ref 0–199)
CO2 SERPL-SCNC: 21 MMOL/L (ref 21–32)
CREAT SERPL-MCNC: 0.7 MG/DL (ref 0.6–1.2)
D DIMER: 2.07 UG/ML FEU (ref 0–0.6)
DEPRECATED RDW RBC AUTO: 13.9 % (ref 12.4–15.4)
EST. AVERAGE GLUCOSE BLD GHB EST-MCNC: 154.2 MG/DL
GFR SERPLBLD CREATININE-BSD FMLA CKD-EPI: >60 ML/MIN/{1.73_M2}
GLUCOSE BLD-MCNC: 138 MG/DL (ref 70–99)
GLUCOSE BLD-MCNC: 148 MG/DL (ref 70–99)
GLUCOSE BLD-MCNC: 150 MG/DL (ref 70–99)
GLUCOSE SERPL-MCNC: 146 MG/DL (ref 70–99)
HBA1C MFR BLD: 7 %
HCT VFR BLD AUTO: 34 % (ref 36–48)
HDLC SERPL-MCNC: 46 MG/DL (ref 40–60)
HGB BLD-MCNC: 11.7 G/DL (ref 12–16)
LDLC SERPL CALC-MCNC: 69 MG/DL
MCH RBC QN AUTO: 31.6 PG (ref 26–34)
MCHC RBC AUTO-ENTMCNC: 34.5 G/DL (ref 31–36)
MCV RBC AUTO: 91.5 FL (ref 80–100)
PERFORMED ON: ABNORMAL
PLATELET # BLD AUTO: 161 K/UL (ref 135–450)
PMV BLD AUTO: 9.3 FL (ref 5–10.5)
POTASSIUM SERPL-SCNC: 3.7 MMOL/L (ref 3.5–5.1)
RBC # BLD AUTO: 3.72 M/UL (ref 4–5.2)
SODIUM SERPL-SCNC: 134 MMOL/L (ref 136–145)
TRIGL SERPL-MCNC: 145 MG/DL (ref 0–150)
VLDLC SERPL CALC-MCNC: 29 MG/DL
WBC # BLD AUTO: 4.9 K/UL (ref 4–11)

## 2024-02-08 PROCEDURE — 85027 COMPLETE CBC AUTOMATED: CPT

## 2024-02-08 PROCEDURE — 93017 CV STRESS TEST TRACING ONLY: CPT | Performed by: INTERNAL MEDICINE

## 2024-02-08 PROCEDURE — A9502 TC99M TETROFOSMIN: HCPCS | Performed by: INTERNAL MEDICINE

## 2024-02-08 PROCEDURE — 6360000002 HC RX W HCPCS: Performed by: INTERNAL MEDICINE

## 2024-02-08 PROCEDURE — 93306 TTE W/DOPPLER COMPLETE: CPT

## 2024-02-08 PROCEDURE — 83036 HEMOGLOBIN GLYCOSYLATED A1C: CPT

## 2024-02-08 PROCEDURE — G0378 HOSPITAL OBSERVATION PER HR: HCPCS

## 2024-02-08 PROCEDURE — 78452 HT MUSCLE IMAGE SPECT MULT: CPT | Performed by: INTERNAL MEDICINE

## 2024-02-08 PROCEDURE — 6360000004 HC RX CONTRAST MEDICATION: Performed by: INTERNAL MEDICINE

## 2024-02-08 PROCEDURE — 80061 LIPID PANEL: CPT

## 2024-02-08 PROCEDURE — 99223 1ST HOSP IP/OBS HIGH 75: CPT | Performed by: INTERNAL MEDICINE

## 2024-02-08 PROCEDURE — 6370000000 HC RX 637 (ALT 250 FOR IP): Performed by: INTERNAL MEDICINE

## 2024-02-08 PROCEDURE — 85379 FIBRIN DEGRADATION QUANT: CPT

## 2024-02-08 PROCEDURE — 2580000003 HC RX 258: Performed by: INTERNAL MEDICINE

## 2024-02-08 PROCEDURE — 80048 BASIC METABOLIC PNL TOTAL CA: CPT

## 2024-02-08 PROCEDURE — 71260 CT THORAX DX C+: CPT

## 2024-02-08 PROCEDURE — 36415 COLL VENOUS BLD VENIPUNCTURE: CPT

## 2024-02-08 PROCEDURE — 3430000000 HC RX DIAGNOSTIC RADIOPHARMACEUTICAL: Performed by: INTERNAL MEDICINE

## 2024-02-08 RX ADMIN — REGADENOSON 0.4 MG: 0.08 INJECTION, SOLUTION INTRAVENOUS at 13:54

## 2024-02-08 RX ADMIN — AMLODIPINE BESYLATE 10 MG: 5 TABLET ORAL at 07:57

## 2024-02-08 RX ADMIN — TETROFOSMIN 30 MILLICURIE: 1.38 INJECTION, POWDER, LYOPHILIZED, FOR SOLUTION INTRAVENOUS at 13:56

## 2024-02-08 RX ADMIN — LISINOPRIL 40 MG: 10 TABLET ORAL at 07:57

## 2024-02-08 RX ADMIN — Medication 5 ML: at 07:57

## 2024-02-08 RX ADMIN — IOPAMIDOL 75 ML: 755 INJECTION, SOLUTION INTRAVENOUS at 10:33

## 2024-02-08 RX ADMIN — ASPIRIN 81 MG 81 MG: 81 TABLET ORAL at 07:57

## 2024-02-08 RX ADMIN — DICYCLOMINE HYDROCHLORIDE 10 MG: 10 CAPSULE ORAL at 07:57

## 2024-02-08 RX ADMIN — TETROFOSMIN 10 MILLICURIE: 1.38 INJECTION, POWDER, LYOPHILIZED, FOR SOLUTION INTRAVENOUS at 12:27

## 2024-02-08 RX ADMIN — POLYVINYL ALCOHOL, POVIDONE 1 DROP: 14; 6 SOLUTION/ DROPS OPHTHALMIC at 10:57

## 2024-02-08 RX ADMIN — CLONIDINE HYDROCHLORIDE 0.1 MG: 0.1 TABLET ORAL at 07:57

## 2024-02-08 ASSESSMENT — PAIN SCALES - GENERAL: PAINLEVEL_OUTOF10: 0

## 2024-02-08 NOTE — CONSULTS
[facility]  Cardiology Note  [phone]      Chief Complaint   Patient presents with    Shortness of Breath     Pt reports intermittent episodes of chest pressure, and shortness of breath since Thursday. Pt denies chest pain.         History of Present Illness:  Shefali Quintanilla is a 73 y.o. patient PMHx Htn, DM-II who presented to the hospital with complaints of chest pain.    Patient reports intermittent shortness of breath and chest pain starting on Thursday. She is unsure if exertion worsens these symptoms. She doesn't recall similar episodes.     She does not follow her BP at home.  Denies recent illness/sick contacts.      Past Medical History:   has a past medical history of Arthritis, Hyperlipidemia, Hypertension, IBS (irritable bowel syndrome), and Type II or unspecified type diabetes mellitus without mention of complication, not stated as uncontrolled.    Surgical History:   has a past surgical history that includes Hysterectomy and Tonsillectomy.     Social History:   reports that she quit smoking about 17 years ago. Her smoking use included cigarettes. She started smoking about 57 years ago. She has a 10.0 pack-year smoking history. She has never used smokeless tobacco. She reports that she does not drink alcohol and does not use drugs.     Family History:  Family History   Problem Relation Age of Onset    Cancer Mother     Stroke Father     Diabetes Sister     Heart Disease Sister     High Blood Pressure Sister     Diabetes Brother     Heart Disease Brother     High Blood Pressure Brother     Osteoporosis Paternal Aunt     Osteoporosis Paternal Uncle     Thyroid Disease Neg Hx        Home Medications:  Were reviewed and are listed in nursing record. and/or listed below  Prior to Admission medications    Medication Sig Start Date End Date Taking? Authorizing Provider   dicyclomine (BENTYL) 10 MG capsule Take 2 capsules by mouth 2 times daily 4/10/23   Provider, Historical, MD   [email or web address]

## 2024-02-08 NOTE — PROGRESS NOTES
Data- discharge order received, pt verbalized agreement to discharge, disposition to previous residence, no needs for HHC/DME.     Action- discharge instructions prepared and given to pt, pt verbalized understanding. Medication information packet given r/t NEW and/or CHANGED prescriptions emphasizing name/purpose/side effects, pt verbalized understanding. Discharge instruction summary: Diet- carb control, Activity- as tolerated, Primary Care Physician as follows: Rashi León -[phone] f/u appointment in 1 week, immunizations reviewed, no new prescription medications filled.     1. WEIGHT: Admit Weight - Scale: 63.5 kg (140 lb) (02/07/24 1500)        Today  Weight - Scale: 64.5 kg (142 lb 4.8 oz) (02/08/24 0530)       2. O2 SAT.: SpO2: 96 % (02/08/24 1620)    Response- Pt belongings gathered, IV removed. Disposition is home (no HHC/DME needs), transported by self, taken to lobby via w/c w/ RN, no complications.

## 2024-02-08 NOTE — PLAN OF CARE
Problem: Discharge Planning  Goal: Discharge to home or other facility with appropriate resources  Outcome: Progressing     Problem: Pain  Goal: Verbalizes/displays adequate comfort level or baseline comfort level  Outcome: Progressing     Problem: Safety - Adult  Goal: Free from fall injury  Outcome: Progressing     Problem: Cardiovascular - Adult  Goal: Maintains optimal cardiac output and hemodynamic stability  Outcome: Progressing  Goal: Absence of cardiac dysrhythmias or at baseline  Outcome: Progressing

## 2024-02-08 NOTE — PROGRESS NOTES
Instructed on sitting Lexiscan Stress Test Procedure including possible side effects/ adverse reactions. Patient verbalizes  understanding and denies having any questions.  See Epic Cardiology.  Lay Birch RN

## 2024-02-08 NOTE — DISCHARGE SUMMARY
V2.0  Discharge Summary    Name:  Shefali Quintanilla /Age/Sex: 1950 (73 y.o. female)   Admit Date: 2024  Discharge Date: 24    MRN & CSN:  [id number] & [id number] Encounter Date and Time 24 4:42 PM EST    Attending:  Virginia Johnson MD Discharging Provider: Virginia Johnson MD       Hospital Course:     Brief HPI: Shefali Quintanilla is a 73 y.o. female with pmh of T2 DM, HTN, HLD, current smoker presented with complaints of chest tightness and shortness of breath x 6 days. Symptoms date back to 2023 when she was evaluated in the ED and discharged home but has had intermittent episodes since then.   Left-sided, described as \"someone sitting on the chest\", unprovoked, no aggravating or relieving factors, radiates into the left shoulder/axillary region, associated with dyspnea. Each episodes last typically minutes to hours and resolved spontaneously. No known history of CAD.    Chest Pain: Resolved.  Troponin negative x 2.  EKG: SB, non-specific ST-T changes.  CXR: mild left basal linear opacity likely atelectasis.  D-Dimer elevated but CT PE negative for PE.  ECHO unremarkable.  Normal LV EF.  Nuclear stress test with normal myocardial perfusion.  Continued ASA, Statin.        T2 DM: Resumed metformin on discharge.  A1c 7.0% 2024.      HTN: BP stable on Home medications.  Toprol-XL discontinued by cardiologist recommendation.      Current smoker: Smoking cessation counseling provided.    The patient expressed appropriate understanding of, and agreement with the discharge recommendations, medications, and plan.     Consults this admission:  IP CONSULT TO CARDIOLOGY    Discharge Diagnosis:   Chest pain      Discharge Instruction:   Follow up appointments: PCP  Primary care physician: Rashi León DO within 2 weeks  Diet: cardiac diet and diabetic diet   Activity: activity as tolerated  Disposition: Discharged to:   [x]Home, []HHC, []SNF, []Acute Rehab, []Hospice   Condition on discharge:    Component Value Date/Time    TSH 3.46 04/18/2011 08:15 AM     Troponin: No results found for: \"TROPONINT\"  Lactic Acid: No results for input(s): \"LACTA\" in the last 72 hours.  BNP: No results for input(s): \"PROBNP\" in the last 72 hours.  UA:No results found for: \"NITRU\", \"COLORU\", \"PHUR\", \"LABCAST\", \"WBCUA\", \"RBCUA\", \"MUCUS\", \"TRICHOMONAS\", \"YEAST\", \"BACTERIA\", \"CLARITYU\", \"SPECGRAV\", \"LEUKOCYTESUR\", \"UROBILINOGEN\", \"BILIRUBINUR\", \"BLOODU\", \"GLUCOSEU\", \"KETUA\", \"AMORPHOUS\"  Urine Cultures: No results found for: \"LABURIN\"  Blood Cultures: No results found for: \"BC\"  No results found for: \"BLOODCULT2\"  Organism: No results found for: \"ORG\"    Time Spent Discharging patient 35 minutes    Electronically signed by Virginia Johnson MD on 2/8/2024 at 4:47 PM

## 2024-02-08 NOTE — PLAN OF CARE
Problem: Discharge Planning  Goal: Discharge to home or other facility with appropriate resources  2/8/2024 0902 by Rocael Gomez RN  Outcome: Progressing     Problem: Pain  Goal: Verbalizes/displays adequate comfort level or baseline comfort level  2/8/2024 0902 by Rocael Gomez RN  Outcome: Progressing     Problem: Safety - Adult  Goal: Free from fall injury  2/8/2024 0902 by Rocael Gomez RN  Outcome: Progressing     Problem: Cardiovascular - Adult  Goal: Maintains optimal cardiac output and hemodynamic stability  2/8/2024 0902 by Rocael Gomez RN  Outcome: Progressing     Problem: Cardiovascular - Adult  Goal: Absence of cardiac dysrhythmias or at baseline  2/8/2024 0902 by Rocael Gomez RN  Outcome: Progressing

## 2024-02-08 NOTE — PROGRESS NOTES
Called CT tech to notify that appropriate PIV has been placed for STAT CT scan. CT lab to place transport for patient.

## 2024-02-08 NOTE — CARE COORDINATION
Discharge Planning Note:  Chart reviewed for discharge needs and it appears that patient has minimal needs for discharge at this time.     Primary Care Physician is Rashi León, DO    Primary insurance is [employer] dual    Please notify case management if any discharge needs are identified.      Case management will continue to follow progress and update discharge plan as needed.

## 2024-02-09 ENCOUNTER — TELEPHONE (OUTPATIENT)
Dept: CARDIOLOGY CLINIC | Age: 74
End: 2024-02-09

## 2024-02-09 LAB
EKG ATRIAL RATE: 54 BPM
EKG DIAGNOSIS: NORMAL
EKG P AXIS: 29 DEGREES
EKG P-R INTERVAL: 130 MS
EKG Q-T INTERVAL: 428 MS
EKG QRS DURATION: 88 MS
EKG QTC CALCULATION (BAZETT): 405 MS
EKG R AXIS: 13 DEGREES
EKG T AXIS: 17 DEGREES
EKG VENTRICULAR RATE: 54 BPM

## 2024-02-09 PROCEDURE — 93010 ELECTROCARDIOGRAM REPORT: CPT | Performed by: INTERNAL MEDICINE

## 2024-02-09 NOTE — TELEPHONE ENCOUNTER
I spoke to pt and gave instructions she has an apt in March in [place] and feels fine right now. She feels she can wait until then.

## 2024-02-09 NOTE — TELEPHONE ENCOUNTER
Pt states when released from hospital they discontinued her Metoprolol and is asking for DKW to review notes and call with his opinion.

## 2024-02-09 NOTE — TELEPHONE ENCOUNTER
Advise her to make a hospital follow up with us. Dr Berry stopped her metoprolol he is Dr Velasquez partner. Advise to continue with discharge instructions, thanks

## 2024-03-12 ASSESSMENT — ENCOUNTER SYMPTOMS
ABDOMINAL PAIN: 0
CHEST TIGHTNESS: 0
PHOTOPHOBIA: 0
ABDOMINAL DISTENTION: 1
BLOOD IN STOOL: 0
NAUSEA: 1
BACK PAIN: 1
COUGH: 0
SHORTNESS OF BREATH: 1

## 2024-03-12 NOTE — PROGRESS NOTES
[facility]  3/13/24  Referring: Dr. León    REASON FOR CONSULT/CHIEF COMPLAINT/HPI     Reason for visit/ Chief complaint  Follow up   Risk factor managment   HPI Shefali Quintanilla is a 73 y.o. seen as a follow up for management of hypertension,hyperlipidemia and diabetes. She has a history of tobacco abuse.  Her sister had several heart attacks..She is unsure of her parents medical history. Father  age 68,mother  age 38 ( had kidney problems)  Ms Quintanilla has been [place] since she was in her 30's    She had a stress echo to evaluate \"indigestion.\" She did not achieve THR,which she attributes to leg weakness/fatigue.     At time of LOV, patient had complaints of uncomfortable sensation in her back ( left and right), that has not changed or increased in severity. Lasts about 15 minutes,.  This is exacerbated by exertion, digging, lifting heavy objects, lasting 15 minutes, alleviated with rest. Recommended CT Calcium Score which was not completed.     Just prior to her last visit 23, she had lost her son. She has a good friend that she talks to for support. She is now living back in [place] since he passed.     She as admitted overnight -24 with SOB and chest pain started a few days prior. Toprol was stopped. ECHO and stress test done> stable findings.    Today, she feels well overall, has no complaints. She denies exertional chest pain, VICK/PND, palpitations, light-headedness, edema. She takes Trazedone to aid her sleep, works well. She is busy but does not exercise regularly.     She quit smoking 3 months ago and states she is breathing easier. She has gained 8#.    Patient is compliant with medications and is tolerating them well without side effects     HISTORY/ALLERGIES/ROS     MedHx:  has a past medical history of Arthritis, Hyperlipidemia, Hypertension, IBS (irritable bowel syndrome), and Type II or unspecified type diabetes mellitus without mention of complication, not stated as

## 2024-03-13 ENCOUNTER — OFFICE VISIT (OUTPATIENT)
Dept: CARDIOLOGY CLINIC | Age: 74
End: 2024-03-13
Payer: COMMERCIAL

## 2024-03-13 VITALS
HEIGHT: 61 IN | OXYGEN SATURATION: 96 % | BODY MASS INDEX: 27.3 KG/M2 | WEIGHT: 144.6 LBS | SYSTOLIC BLOOD PRESSURE: 116 MMHG | HEART RATE: 73 BPM | DIASTOLIC BLOOD PRESSURE: 74 MMHG

## 2024-03-13 DIAGNOSIS — E78.2 MIXED HYPERLIPIDEMIA: ICD-10-CM

## 2024-03-13 DIAGNOSIS — I10 ESSENTIAL HYPERTENSION: Primary | ICD-10-CM

## 2024-03-13 DIAGNOSIS — R06.02 SHORTNESS OF BREATH: ICD-10-CM

## 2024-03-13 PROCEDURE — G8427 DOCREV CUR MEDS BY ELIG CLIN: HCPCS | Performed by: INTERNAL MEDICINE

## 2024-03-13 PROCEDURE — G8400 PT W/DXA NO RESULTS DOC: HCPCS | Performed by: INTERNAL MEDICINE

## 2024-03-13 PROCEDURE — 1090F PRES/ABSN URINE INCON ASSESS: CPT | Performed by: INTERNAL MEDICINE

## 2024-03-13 PROCEDURE — 99214 OFFICE O/P EST MOD 30 MIN: CPT | Performed by: INTERNAL MEDICINE

## 2024-03-13 PROCEDURE — G8484 FLU IMMUNIZE NO ADMIN: HCPCS | Performed by: INTERNAL MEDICINE

## 2024-03-13 PROCEDURE — 3074F SYST BP LT 130 MM HG: CPT | Performed by: INTERNAL MEDICINE

## 2024-03-13 PROCEDURE — 1036F TOBACCO NON-USER: CPT | Performed by: INTERNAL MEDICINE

## 2024-03-13 PROCEDURE — 3017F COLORECTAL CA SCREEN DOC REV: CPT | Performed by: INTERNAL MEDICINE

## 2024-03-13 PROCEDURE — 3078F DIAST BP <80 MM HG: CPT | Performed by: INTERNAL MEDICINE

## 2024-03-13 PROCEDURE — G8419 CALC BMI OUT NRM PARAM NOF/U: HCPCS | Performed by: INTERNAL MEDICINE

## 2024-03-13 PROCEDURE — 1123F ACP DISCUSS/DSCN MKR DOCD: CPT | Performed by: INTERNAL MEDICINE

## 2024-03-13 NOTE — PATIENT INSTRUCTIONS
Encourage regular exercise / walks.    No medication changes.    Call with any questions or concerns: [phone] [phone].